# Patient Record
Sex: MALE | Race: ASIAN | NOT HISPANIC OR LATINO | Employment: STUDENT | URBAN - METROPOLITAN AREA
[De-identification: names, ages, dates, MRNs, and addresses within clinical notes are randomized per-mention and may not be internally consistent; named-entity substitution may affect disease eponyms.]

---

## 2017-01-04 ENCOUNTER — ALLSCRIPTS OFFICE VISIT (OUTPATIENT)
Dept: OTHER | Facility: OTHER | Age: 22
End: 2017-01-04

## 2017-05-19 ENCOUNTER — ALLSCRIPTS OFFICE VISIT (OUTPATIENT)
Dept: OTHER | Facility: OTHER | Age: 22
End: 2017-05-19

## 2017-05-21 ENCOUNTER — GENERIC CONVERSION - ENCOUNTER (OUTPATIENT)
Dept: OTHER | Facility: OTHER | Age: 22
End: 2017-05-21

## 2017-05-26 ENCOUNTER — APPOINTMENT (OUTPATIENT)
Dept: PHYSICAL THERAPY | Facility: REHABILITATION | Age: 22
End: 2017-05-26
Payer: COMMERCIAL

## 2017-05-26 PROCEDURE — 97161 PT EVAL LOW COMPLEX 20 MIN: CPT

## 2017-05-26 PROCEDURE — 97140 MANUAL THERAPY 1/> REGIONS: CPT

## 2017-05-26 PROCEDURE — 97110 THERAPEUTIC EXERCISES: CPT

## 2017-05-31 ENCOUNTER — APPOINTMENT (OUTPATIENT)
Dept: PHYSICAL THERAPY | Facility: REHABILITATION | Age: 22
End: 2017-05-31
Payer: COMMERCIAL

## 2017-05-31 PROCEDURE — 97140 MANUAL THERAPY 1/> REGIONS: CPT

## 2017-05-31 PROCEDURE — 97110 THERAPEUTIC EXERCISES: CPT

## 2017-06-07 ENCOUNTER — APPOINTMENT (OUTPATIENT)
Dept: PHYSICAL THERAPY | Facility: REHABILITATION | Age: 22
End: 2017-06-07
Payer: COMMERCIAL

## 2017-06-07 PROCEDURE — 97140 MANUAL THERAPY 1/> REGIONS: CPT

## 2017-06-07 PROCEDURE — 97110 THERAPEUTIC EXERCISES: CPT

## 2017-09-15 ENCOUNTER — ALLSCRIPTS OFFICE VISIT (OUTPATIENT)
Dept: OTHER | Facility: OTHER | Age: 22
End: 2017-09-15

## 2018-01-11 NOTE — PROGRESS NOTES
Chief Complaint  Patient is here for his 3rd HPV vaccination  Active Problems    1  Gastroenteritis (558 9) (K52 9)   2  History of allergy (V15 09) (Z88 9)   3  Need for Menactra vaccination (V03 89) (Z23)   4  Reactive airway disease (493 90) (J45 909)   5  Right foot pain (729 5) (M79 671)   6  Tic disorder (307 20) (F95 9)   7  Upper respiratory infection (465 9) (J06 9)    Current Meds   1  ProAir  (90 Base) MCG/ACT Inhalation Aerosol Solution; 2 PUFFS EVERY 4 TO   6 HOURS AS NEEDED FOR COUGH/WHEEZE;   Therapy: 94YEP3767 to (Last IB:07QFX8336)  Requested for: 69KSR9144 Ordered    Allergies    1  No Known Drug Allergies    Plan  Need for HPV vaccination    · Gardasil 9 Intramuscular Suspension Prefilled Syringe    Future Appointments    Date/Time Provider Specialty Site   08/15/2016 09:00 AM WILLA Murguia  Family Medicine [address] [place] [address]     Signatures   Electronically signed by :  WILLA Shepherd ; [place]  3 2016  5:14PM EST                       (Author)

## 2018-01-11 NOTE — PROGRESS NOTES
Chief Complaint  Patient is here to receive the influenza vaccination  Active Problems    1  Eczema (692 9) (L30 9)   2  Gastroenteritis (558 9) (K52 9)   3  History of allergy (V15 09) (Z88 9)   4  Need for HPV vaccination (V04 89) (Z23)   5  Need for Menactra vaccination (V03 89) (Z23)   6  Need for meningococcal vaccination (V03 89) (Z23)   7  Reactive airway disease (493 90) (J45 909)   8  Right foot pain (729 5) (M79 671)   9  Tic disorder (307 20) (F95 9)   10  Upper respiratory infection (465 9) (J06 9)    Current Meds   1  ProAir  (90 Base) MCG/ACT Inhalation Aerosol Solution; 2 PUFFS EVERY 4 TO   6 HOURS AS NEEDED FOR COUGH/WHEEZE;   Therapy: 28MCG8663 to (Last WB:[id number])  Requested for: 77UPB8456 Ordered   2  Triamcinolone Acetonide 0 5 % External Cream; APPLY 2-3 TIMES DAILY TO   AFFECTED AREA(S); Therapy: 53SRP2849 to (Last Rx:54Drm8237)  Requested for: 05Kyl4300 Ordered    Allergies    1  No Known Drug Allergies    Vitals  Signs    Temperature: 96 3 F    Plan  Need for prophylactic vaccination and inoculation against influenza    · Fluzone Quadrivalent 0 5 ML Intramuscular Suspension    Signatures   Electronically signed by :  WILLA Vasquez ; Oct  8 2016  8:05PM EST                       (Author)

## 2018-01-12 VITALS
HEIGHT: 71 IN | WEIGHT: 141.38 LBS | TEMPERATURE: 101.2 F | HEART RATE: 86 BPM | BODY MASS INDEX: 19.79 KG/M2 | SYSTOLIC BLOOD PRESSURE: 120 MMHG | DIASTOLIC BLOOD PRESSURE: 76 MMHG

## 2018-01-12 NOTE — PROGRESS NOTES
Assessment    1  Encounter for preventive health examination (V70 0) (Z00 00)   2  Eczema (692 9) (L30 9)    Plan  Eczema    · Triamcinolone Acetonide 0 5 % External Cream; APPLY 2-3 TIMES DAILY TO  AFFECTED AREA(S)  Need for meningococcal vaccination    · Trumenba Intramuscular Suspension Prefilled Syringe; INJECT 0 5  ML  Intramuscular; To Be Done: [id number]  Tic disorder    · (1) SICKLE CELL TEST; Status:Active; Requested for:10Hxq6793;     Discussion/Summary    Annual well exam   Routine immunizations are up-to-date  We will administer meningitis be vaccine #3 in fall  Patient will try topical steroid cream for eczema rash  He is cleared for athletic participation  Follow up annually and as needed  Possible side effects of new medications were reviewed with the patient/guardian today  The patient, patient's family was counseled regarding instructions for management, risk factor reductions, impressions  Chief Complaint  well exam      History of Present Illness  HM, 12-18 years Male (Brief):   General Health: The child's health since the last visit is described as good   no illness since last visit  Immunization status: Up to date   the patient has not had any significant adverse reactions to immunizations  Caregiver concerns:   Caregivers deny concerns regarding nutrition, sleep, behavior, school and development  Nutrition/Elimination:   Diet:  his current diet is diverse and healthy  The patient does not use dietary supplements  No elimination issues are expressed  Sleep:  No sleep issues are reported  Behavior: The child's temperament is described as happy, independent and energetic  No behavior issues identified  Health Risks:  No significant risk factors are identified     Childcare/School:   Sports Participation Questions:   History Questions: Cardiac History: no chest pain during exercise, no chest pressure during exercise, no chest discomfort during exercise, no passing out or nearly passing out during exercise and has not passed out or nearly passed out after exercise  Musculoskeletal: no history of a bone or joint injury that required either imaging, surgery, injections, rehabilitation, PT, bracing, casting, or crutches, has not had a bone fracture or dislocation, no history of atlantoaxial neck instability, no history of stress fracture, has not had severe muscle cramps or illness after exercising in the heat, no missed participation due to a sprain, ligament tear or tendonitis, no use of a brace or assistive device and has not had an xray in the past for atlantoaxial neck instability  Neurologic History: no memory loss or confusion after being hit in the head, has not had a concussion or head injury and no seizures  HPI: freshman at [employer]   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   no exertional s/o   no daily Rx  Patient denies chest pain, palpitations, shortness of breath or dizziness  Review of Systems    Constitutional: No complaints of tiredness, feels well, no fever, no chills, no recent weight gain or loss  Eyes: No complaints of eye pain, no discharge from eyes, no eyesight problems, eyes do not itch, no red or dry eyes  ENT: no complaints of nasal discharge, no earache, no loss of hearing, no hoarseness or sore throat, no nosebleeds  Cardiovascular: No complaints of chest pain, no palpitations, normal heart rate, no leg claudication or lower leg edema  Respiratory: No complaints of shortness of breath, no wheezing or cough, no dyspnea on exertion  Gastrointestinal: No complaints of abdominal pain, no nausea or vomiting, no constipation, no diarrhea or bloody stools  Genitourinary: No complaints of testicular pain, no dysuria or nocturia, no incontinence, no hesitancy, no gential lesion  Musculoskeletal: No complaints of joint stiffness or swelling, no myalgias, no limb pain or swelling     Integumentary: No complaints of skin rash, no skin lesions or wounds, no itching, no dry skin  Neurological: No complaints of headache, no numbness or tingling, no dizziness or fainting, no confusion, no convulsions, no limb weakness or difficulty walking  Psychiatric: No complaints of feeling depressed, no suicidal thoughts, no emotional problems, no anxiety, no sleep disturbances or changes in personality  Endocrine: No complaints of muscle weakness, no feelings of weakness, no erectile dysfunction, no deepening of voice, no hot flashes or proptosis  Hematologic/Lymphatic: No complaints of swollen glands, no neck swollen glands, does not bleed or bruise easily  ROS reported by the patient  Active Problems    1  Gastroenteritis (558 9) (K52 9)   2  History of allergy (V15 09) (Z88 9)   3  Need for HPV vaccination (V04 89) (Z23)   4  Need for Menactra vaccination (V03 89) (Z23)   5  Need for meningococcal vaccination (V03 89) (Z23)   6  Reactive airway disease (493 90) (J45 909)   7  Right foot pain (729 5) (M79 671)   8  Tic disorder (307 20) (F95 9)   9  Upper respiratory infection (465 9) (J06 9)    Family History  Mother    · Family history of Esophageal Reflux  Father    · Family history of Nephrolithiasis  Family History    · Family history of Generalized Anxiety Disorder    Social History    · Never A Smoker   · Never Drank Alcohol    Current Meds   1  ProAir  (90 Base) MCG/ACT Inhalation Aerosol Solution; 2 PUFFS EVERY 4 TO   6 HOURS AS NEEDED FOR COUGH/WHEEZE;   Therapy: 51YTH4959 to (Last EW:75DWA2050)  Requested for: 75UTO5161 Ordered    Allergies    1   No Known Drug Allergies    Vitals   Recorded: 24Shv9794 09:10AM Recorded: 04TAW6924 17:15WI   Systolic 835    Diastolic 64    Heart Rate 66    Respiration 12    Temperature 96 4 F    Height  5 ft 10 5 in   Weight 142 lb 2 08 oz 142 lb 2 08 oz   BMI Calculated  20 11   BSA Calculated  1 82     Physical Exam    Constitutional - General appearance: No acute distress, well appearing and well nourished  Head and Face - Head and face: Normocephalic, atraumatic  Eyes - Conjunctiva and lids: No injection, edema or discharge  Pupils and irises: Equal, round, reactive to light bilaterally  Ears, Nose, Mouth, and Throat - Oropharynx: Moist mucosa, normal tongue and tonsils without lesions  Neck - Neck: Supple, symmetric, no masses  Thyroid: No thyromegaly  Pulmonary - Respiratory effort: Normal respiratory rate and rhythm, no increased work of breathing  Auscultation of lungs: Clear bilaterally  Cardiovascular - Auscultation of heart: Regular rate and rhythm, normal S1 and S2, no murmur  Carotid pulses: Normal, 2+ bilaterally  Abdominal aorta: Normal  Examination of extremities for edema and/or varicosities: Normal    Musculoskeletal - Gait and station: Normal gait  Skin - Skin and subcutaneous tissue: Abnormal  Eczema rash bilateral forearms, mildly pruritic  Neurologic - Cranial nerves: Normal    Psychiatric - judgment and insight: Normal  Orientation to person, place, and time: Normal  Recent and remote memory: Normal  Mood and affect: Normal       Procedure    Procedure:   Results: 20/10 in both eyes without corrective device, 20/10 in the right eye without corrective device, 20/10 in the left eye without corrective device      Signatures   Electronically signed by :  WILLA Vasquez ; Aug 18 2016  7:04AM EST                       (Author)

## 2018-01-13 NOTE — MISCELLANEOUS
Message  I spoke with patient's mother today  He is only running a low-grade fever and feels okay daytime  He is bothered with severe cough at night, no relief with Robitussin over-the-counter and Tessalon Perles  He expectorated little bit of blood after long coughing spells at night  Patient is sleeping and resting now  Reportedly MAXIMUM TEMPERATURE yesterday's 99  Prescription for Robitussin with codeine was called into the [facility]  I advise mother to contact me tomorrow if he still running fever or expectorating blood, we will proceed with chest x-ray then  Signatures   Electronically signed by :  WILLA Miller ; May 21 2017 12:42PM EST                       (Author)

## 2018-01-15 NOTE — PROGRESS NOTES
Chief Complaint  pt here for trumemba no 3 today , temp was 98 1      Active Problems    1  Eczema (692 9) (L30 9)   2  Gastroenteritis (558 9) (K52 9)   3  History of allergy (V15 09) (Z88 9)   4  Need for HPV vaccination (V04 89) (Z23)   5  Need for Menactra vaccination (V03 89) (Z23)   6  Need for meningococcal vaccination (V03 89) (Z23)   7  Need for prophylactic vaccination and inoculation against influenza (V04 81) (Z23)   8  Reactive airway disease (493 90) (J45 909)   9  Right foot pain (729 5) (M79 671)   10  Tic disorder (307 20) (F95 9)   11  Upper respiratory infection (465 9) (J06 9)    Current Meds   1  ProAir  (90 Base) MCG/ACT Inhalation Aerosol Solution; 2 PUFFS EVERY 4 TO   6 HOURS AS NEEDED FOR COUGH/WHEEZE;   Therapy: 56BUX9826 to (Last EZ:26VSE9878)  Requested for: 85VER4586 Ordered   2  Triamcinolone Acetonide 0 5 % External Cream; APPLY 2-3 TIMES DAILY TO   AFFECTED AREA(S); Therapy: 64BNP0363 to (Last Rx:51Iyx8140)  Requested for: 24Wnm6942 Ordered    Allergies    1  No Known Drug Allergies    Plan  Health Maintenance    · Trumenba Intramuscular Suspension Prefilled Syringe    Signatures   Electronically signed by :  WILLA Stevens ; Jan 4 2017  4:37PM EST                       (Author)

## 2018-01-17 NOTE — PROGRESS NOTES
Chief Complaint  Pt is here for nurse visit for influenza vaccine FRF      Active Problems    1  Acute sinusitis (461 9) (J01 90)   2  Eczema (692 9) (L30 9)   3  Gastroenteritis (558 9) (K52 9)   4  History of allergy (V15 09) (Z88 9)   5  Infection, upper respiratory (465 9) (J06 9)   6  Need for HPV vaccination (V04 89) (Z23)   7  Need for Menactra vaccination (V03 89) (Z23)   8  Need for meningococcal vaccination (V03 89) (Z23)   9  Need for prophylactic vaccination and inoculation against influenza (V04 81) (Z23)   10  Reactive airway disease (493 90) (J45 909)   11  Right foot pain (729 5) (M79 671)   12  Tic disorder (307 20) (F95 9)    Current Meds   1  Benzonatate 100 MG Oral Capsule; TAKE ONE CAPSULE BY MOUTH 3 TIMES A DAY   AS NEEDED FOR COUGH; Therapy: 97UPY4745 to (Last Rx:26Jqj4370)  Requested for: 34BKV9140 Ordered    Allergies    1  No Known Drug Allergies    Vitals  Signs    Temperature: 96 5 F    Plan  Need for prophylactic vaccination and inoculation against influenza    · Fluzone Quadrivalent 0 5 ML Intramuscular Suspension Prefilled Syringe    Signatures   Electronically signed by :  WILLA Ding ; Sep 16 2017 10:29AM EST                       (Author)

## 2018-01-22 VITALS — TEMPERATURE: 96.5 F

## 2018-10-12 ENCOUNTER — IMMUNIZATION (OUTPATIENT)
Dept: FAMILY MEDICINE CLINIC | Facility: CLINIC | Age: 23
End: 2018-10-12
Payer: COMMERCIAL

## 2018-10-12 DIAGNOSIS — Z23 ENCOUNTER FOR IMMUNIZATION: ICD-10-CM

## 2018-10-12 PROCEDURE — 90686 IIV4 VACC NO PRSV 0.5 ML IM: CPT

## 2018-10-12 PROCEDURE — 90471 IMMUNIZATION ADMIN: CPT

## 2019-10-15 ENCOUNTER — OFFICE VISIT (OUTPATIENT)
Dept: FAMILY MEDICINE CLINIC | Facility: CLINIC | Age: 24
End: 2019-10-15
Payer: COMMERCIAL

## 2019-10-15 VITALS
RESPIRATION RATE: 16 BRPM | BODY MASS INDEX: 22.54 KG/M2 | OXYGEN SATURATION: 97 % | TEMPERATURE: 96.2 F | HEIGHT: 70 IN | HEART RATE: 49 BPM | DIASTOLIC BLOOD PRESSURE: 60 MMHG | SYSTOLIC BLOOD PRESSURE: 100 MMHG | WEIGHT: 157.4 LBS

## 2019-10-15 DIAGNOSIS — Z23 INFLUENZA VACCINE NEEDED: ICD-10-CM

## 2019-10-15 DIAGNOSIS — Z00.00 PHYSICAL EXAM: Primary | ICD-10-CM

## 2019-10-15 DIAGNOSIS — R82.90 ABNORMAL FINDING IN URINE: ICD-10-CM

## 2019-10-15 DIAGNOSIS — Z13.220 LIPID SCREENING: ICD-10-CM

## 2019-10-15 DIAGNOSIS — Z23 ENCOUNTER FOR IMMUNIZATION: ICD-10-CM

## 2019-10-15 LAB
BACTERIA UR QL AUTO: NORMAL /HPF
BILIRUB UR QL STRIP: NEGATIVE
CLARITY UR: CLEAR
COLOR UR: YELLOW
GLUCOSE UR STRIP-MCNC: NEGATIVE MG/DL
HGB UR QL STRIP.AUTO: NEGATIVE
HYALINE CASTS #/AREA URNS LPF: NORMAL /LPF
KETONES UR STRIP-MCNC: NEGATIVE MG/DL
LEUKOCYTE ESTERASE UR QL STRIP: NEGATIVE
NITRITE UR QL STRIP: NEGATIVE
NON-SQ EPI CELLS URNS QL MICRO: NORMAL /HPF
PH UR STRIP.AUTO: 7.5 [PH]
PROT UR STRIP-MCNC: NEGATIVE MG/DL
RBC #/AREA URNS AUTO: NORMAL /HPF
SL AMB  POCT GLUCOSE, UA: NEGATIVE
SL AMB LEUKOCYTE ESTERASE,UA: NEGATIVE
SL AMB POCT BILIRUBIN,UA: NEGATIVE
SL AMB POCT BLOOD,UA: NEGATIVE
SL AMB POCT CLARITY,UA: CLEAR
SL AMB POCT COLOR,UA: ABNORMAL
SL AMB POCT KETONES,UA: NEGATIVE
SL AMB POCT NITRITE,UA: NEGATIVE
SL AMB POCT PH,UA: 6
SL AMB POCT SPECIFIC GRAVITY,UA: 1
SL AMB POCT URINE PROTEIN: ABNORMAL
SL AMB POCT UROBILINOGEN: 0.2
SP GR UR STRIP.AUTO: 1 (ref 1–1.03)
UROBILINOGEN UR QL STRIP.AUTO: 0.2 E.U./DL
WBC #/AREA URNS AUTO: NORMAL /HPF

## 2019-10-15 PROCEDURE — 90686 IIV4 VACC NO PRSV 0.5 ML IM: CPT | Performed by: NURSE PRACTITIONER

## 2019-10-15 PROCEDURE — 99395 PREV VISIT EST AGE 18-39: CPT | Performed by: NURSE PRACTITIONER

## 2019-10-15 PROCEDURE — 81003 URINALYSIS AUTO W/O SCOPE: CPT | Performed by: NURSE PRACTITIONER

## 2019-10-15 PROCEDURE — 90715 TDAP VACCINE 7 YRS/> IM: CPT | Performed by: NURSE PRACTITIONER

## 2019-10-15 PROCEDURE — 90472 IMMUNIZATION ADMIN EACH ADD: CPT | Performed by: NURSE PRACTITIONER

## 2019-10-15 PROCEDURE — 90471 IMMUNIZATION ADMIN: CPT | Performed by: NURSE PRACTITIONER

## 2019-10-15 PROCEDURE — 81001 URINALYSIS AUTO W/SCOPE: CPT | Performed by: NURSE PRACTITIONER

## 2019-10-15 RX ORDER — MELOXICAM 15 MG/1
15 TABLET ORAL DAILY PRN
Refills: 0 | COMMUNITY
Start: 2019-08-13 | End: 2019-10-15 | Stop reason: ALTCHOICE

## 2019-10-15 NOTE — PROGRESS NOTES
FAMILY PRACTICE OFFICE VISIT       NAME: Mary Marcus  AGE: 26 y o  SEX: male       : 1995        MRN: [id number]    DATE: 10/15/2019    Assessment and Plan     Problem List Items Addressed This Visit     None      Visit Diagnoses     Physical exam    -  Primary    Relevant Orders    CBC    Comprehensive metabolic panel    POCT urine dip auto non-scope (Completed)    Lipid screening        Relevant Orders    Lipid panel    Abnormal finding in urine        Relevant Orders    Urinalysis with microscopic (Completed)    Encounter for immunization        Relevant Orders    TDAP VACCINE GREATER THAN OR EQUAL TO 6YO IM (Completed)    Influenza vaccine needed        Relevant Orders    FLUZONE: influenza vaccine, quadrivalent, 0 5 mL (Completed)        1  Physical exam  Healthy-appearing 27-year-old male  Updated Tdap vaccination today  Influenza vaccination administered today  Will check routine blood work, including CBC, CMP, lipid panel  Small amount of protein noted in urine dip  Will send urinalysis for further evaluation  Discussed healthy lifestyle, with regular exercise, eating a healthy well-balanced diet, adequate sleep, healthy alcohol intake, avoiding drugs, driving safely, using condoms with sexual activity  Follow-up in 1 year for annual physical exam, or sooner if needed  CBC    Comprehensive metabolic panel    POCT urine dip auto non-scope    CANCELED: POCT urine dip auto non-scope   2  Lipid screening  Lipid panel   3  Abnormal finding in urine  Urinalysis with microscopic   4  Encounter for immunization  TDAP VACCINE GREATER THAN OR EQUAL TO 6YO IM   5  Influenza vaccine needed  FLUZONE: influenza vaccine, quadrivalent, 0 5 mL           Chief Complaint     Chief Complaint   Patient presents with    Physical Exam       History of Present Illness     Mary Marcus is a 27-year-old male presenting today for physical exam   Accompanied by his brother today  Reports his health is good  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  Exercises through weightlifting, tennis, biking, running, playing football  Eating a fairly healthy diet, with plenty of fruits and vegetables  Has dental exams every 6 months  No problems with vision or hearing  Wears seatbelt with driving  Drinks alcohol occasionally socially  Denies any binge drinking  Denies being sexually active  Review of Systems   Review of Systems   Constitutional: Negative  HENT: Negative  Eyes: Negative  Respiratory: Negative  Cardiovascular: Negative  Gastrointestinal: Negative  Endocrine: Negative  Genitourinary: Negative  Musculoskeletal: Negative  Skin: Negative  Allergic/Immunologic: Negative  Neurological: Negative  Hematological: Negative  Psychiatric/Behavioral: Negative  Active Problem List     Patient Active Problem List   Diagnosis    Reactive airway disease    Tic disorder    Eczema       Past Medical History:  No past medical history on file      Past Surgical History:  Past Surgical History:   Procedure Laterality Date    WISDOM TOOTH EXTRACTION         Family History:  Family History   Problem Relation Age of Onset    MARYAM disease Mother     Nephrolithiasis Father     Anxiety disorder Family        Social History:  Social History     Socioeconomic History    Marital status: Single     Spouse name: Not on file    Number of children: Not on file    Years of education: Not on file    Highest education level: Not on file   Occupational History    Not on file   Social Needs    Financial resource strain: Not on file    Food insecurity:     Worry: Not on file     Inability: Not on file    Transportation needs:     Medical: Not on file     Non-medical: Not on file   Tobacco Use    Smoking status: Never Smoker   Substance and Sexual Activity    Alcohol use: Yes     Comment: occasionally social    Drug use: Never    Sexual activity: Not Currently   Lifestyle    Physical activity:     Days per week: Not on file     Minutes per session: Not on file    Stress: Not on file   Relationships    Social connections:     Talks on phone: Not on file     Gets together: Not on file     Attends Yazidi service: Not on file     Active member of club or organization: Not on file     Attends meetings of clubs or organizations: Not on file     Relationship status: Not on file    Intimate partner violence:     Fear of current or ex partner: Not on file     Emotionally abused: Not on file     Physically abused: Not on file     Forced sexual activity: Not on file   Other Topics Concern    Not on file   Social History Narrative    [facility] [facility] [employer]       I have reviewed the patient's medical history in detail; there are no changes to the history as noted in the electronic medical record  Objective     Vitals:    10/15/19 1107   BP: 100/60   BP Location: Left arm   Patient Position: Sitting   Cuff Size: Standard   Pulse: (!) 49   Resp: 16   Temp: (!) 96 2 °F (35 7 °C)   SpO2: 97%   Weight: 71 4 kg (157 lb 6 4 oz)   Height: 5' 10" (1 778 m)     Wt Readings from Last 3 Encounters:   10/15/19 71 4 kg (157 lb 6 4 oz)   05/19/17 64 1 kg (141 lb 6 1 oz) (29 %, Z= -0 55)*   08/15/16 64 5 kg (142 lb 2 1 oz) (35 %, Z= -0 39)*     * Growth percentiles are based on CDC (Boys, 2-20 Years) data  Body mass index is 22 58 kg/m²  PHQ-9 Depression Screening    PHQ-9:    Frequency of the following problems over the past two weeks:       Little interest or pleasure in doing things:  0 - not at all  Feeling down, depressed, or hopeless:  0 - not at all  PHQ-2 Score:  0       Physical Exam   Constitutional: He is oriented to person, place, and time  He appears well-developed and well-nourished  HENT:   Head: Normocephalic and atraumatic     Right Ear: Tympanic membrane, external ear and ear canal normal    Left Ear: Tympanic membrane, external ear and ear canal normal    Mouth/Throat: Uvula is midline and oropharynx is clear and moist    Eyes: Pupils are equal, round, and reactive to light  Conjunctivae are normal    Neck: Normal range of motion  Neck supple  No thyromegaly present  Cardiovascular: Normal rate and regular rhythm  No murmur heard  HR 60   Pulmonary/Chest: Effort normal and breath sounds normal    Abdominal: Soft  Bowel sounds are normal  There is no tenderness  Musculoskeletal: Normal range of motion  He exhibits no edema  Lymphadenopathy:     He has no cervical adenopathy  Neurological: He is alert and oriented to person, place, and time  Skin: No rash noted  Psychiatric: He has a normal mood and affect  Nursing note and vitals reviewed  ALLERGIES:  Allergies no known allergies    Current Medications     No current outpatient medications on file  No current facility-administered medications for this visit            Health Maintenance     Health Maintenance   Topic Date Due    Depression Screening PHQ  10/15/2020    BMI: Adult  10/15/2020    DTaP,Tdap,and Td Vaccines (8 - Td) 10/15/2029    Pneumococcal Vaccine: 65+ Years (1 of 2 - PCV13) 12/17/2062    INFLUENZA VACCINE  Completed    HEPATITIS B VACCINES  Completed    Pneumococcal Vaccine: Pediatrics (0 to 5 Years) and At-Risk Patients (6 to 59 Years)  Aged [employer] History   Administered Date(s) Administered    DTaP 5 02/18/1998, 04/20/1998, 06/18/1998, 06/21/1999, 01/21/2002    HPV Quadrivalent 05/15/2015, 08/25/2015    HPV9 06/03/2016    Hep A, ped/adol, 2 dose 09/25/2012    Hep B, adult 02/18/1998, 04/20/1998, 12/23/1998    Hib (PRP-OMP) 02/18/1998, 04/20/1998, 06/18/1998, 01/18/2001    INFLUENZA 09/25/2012, 10/12/2015, 10/07/2016, 09/15/2017    IPV 02/18/1998, 04/20/1998, 06/21/1999, 01/21/2002    Influenza Quadrivalent Preservative Free 3 years and older IM 10/07/2016, 09/15/2017    Influenza TIV (IM) 09/02/2011, 10/14/2013, 10/03/2014, 10/12/2015    Influenza, injectable, quadrivalent, preservative free 0 5 mL 10/12/2018, 10/15/2019    MMR 08/18/1999, 01/17/2003    Meningococcal B, Recombinant 10/19/2015, 06/30/2016, 01/04/2017    Meningococcal, Unknown Serogroups 07/31/2009, 05/15/2015    Tdap 07/31/2009, 10/15/2019    Typhoid, ViCPs 09/26/2012    Varicella 01/18/2001, 07/31/2009       AKILAH House

## 2019-10-16 NOTE — RESULT ENCOUNTER NOTE
Please let patient know urinalysis is normal  There is no protein in his urine that was sent to the lab

## 2019-12-30 ENCOUNTER — OFFICE VISIT (OUTPATIENT)
Dept: FAMILY MEDICINE CLINIC | Facility: CLINIC | Age: 24
End: 2019-12-30
Payer: COMMERCIAL

## 2019-12-30 VITALS
HEIGHT: 70 IN | SYSTOLIC BLOOD PRESSURE: 122 MMHG | HEART RATE: 56 BPM | RESPIRATION RATE: 18 BRPM | BODY MASS INDEX: 22.22 KG/M2 | DIASTOLIC BLOOD PRESSURE: 80 MMHG | TEMPERATURE: 98.8 F | WEIGHT: 155.2 LBS | OXYGEN SATURATION: 98 %

## 2019-12-30 DIAGNOSIS — J06.9 UPPER RESPIRATORY TRACT INFECTION, UNSPECIFIED TYPE: ICD-10-CM

## 2019-12-30 DIAGNOSIS — J32.9 SINUSITIS, UNSPECIFIED CHRONICITY, UNSPECIFIED LOCATION: Primary | ICD-10-CM

## 2019-12-30 PROCEDURE — 1036F TOBACCO NON-USER: CPT | Performed by: FAMILY MEDICINE

## 2019-12-30 PROCEDURE — 3008F BODY MASS INDEX DOCD: CPT | Performed by: FAMILY MEDICINE

## 2019-12-30 PROCEDURE — 99213 OFFICE O/P EST LOW 20 MIN: CPT | Performed by: FAMILY MEDICINE

## 2019-12-30 RX ORDER — AMOXICILLIN AND CLAVULANATE POTASSIUM 875; 125 MG/1; MG/1
1 TABLET, FILM COATED ORAL EVERY 12 HOURS SCHEDULED
Qty: 14 TABLET | Refills: 0 | Status: SHIPPED | OUTPATIENT
Start: 2019-12-30 | End: 2020-01-06

## 2019-12-30 NOTE — PROGRESS NOTES
FAMILY PRACTICE OFFICE VISIT       NAME: Kirsten Zeng  AGE: 27 y o  SEX: male       : 1995        MRN: [id number]    DATE: 2019  TIME: 9:10 AM    Assessment and Plan     Problem List Items Addressed This Visit     None      Visit Diagnoses     Sinusitis, unspecified chronicity, unspecified location    -  Primary    Relevant Medications    amoxicillin-clavulanate (AUGMENTIN) 875-125 mg per tablet    Upper respiratory tract infection, unspecified type        Relevant Medications    amoxicillin-clavulanate (AUGMENTIN) 875-125 mg per tablet        Sinusitis, upper respiratory infection:  72-year-old male presents today with symptoms that are consistent with sinusitis, upper respiratory infection  Patient states he is feeling better today  I will call in Rx for Augmentin and have him decide if he continues to feel better than will hold off on him taking Augmentin  If symptoms do persist however then he may take this with food for 7 days  Continue with symptomatic treatment and supportive measures including adequate hydration  Recommend nasal saline rinses, hot steam humidification in the shower, cool mist humidifier in the bedroom  May also benefit from honey as a cough suppressant as well  Return parameters discussed  There are no Patient Instructions on file for this visit  Chief Complaint     Chief Complaint   Patient presents with    Sinus Problem       History of Present Illness     HPI  72-year-old male presents today with 1-2 we h/o nasal congestion, low grade fever sinus pressure, pnd, cough that is occasionally productive, nasal drainage that is green  Has been taking dayquil, nyquil, vit c     Has been exposed to sick conatcts   Review of Systems   Review of Systems   Constitutional: Negative for chills and fever  HENT: Positive for congestion, postnasal drip, rhinorrhea and sinus pressure  Negative for ear pain and sore throat  Respiratory: Positive for cough  Negative for chest tightness, shortness of breath and wheezing  Active Problem List     Patient Active Problem List   Diagnosis    Reactive airway disease    Tic disorder    Eczema       Past Medical History:  History reviewed  No pertinent past medical history      Past Surgical History:  Past Surgical History:   Procedure Laterality Date    WISDOM TOOTH EXTRACTION         Family History:  Family History   Problem Relation Age of Onset    MARYAM disease Mother     Nephrolithiasis Father     Anxiety disorder Family        Social History:  Social History     Socioeconomic History    Marital status: Single     Spouse name: Not on file    Number of children: Not on file    Years of education: Not on file    Highest education level: Not on file   Occupational History    Not on file   Social Needs    Financial resource strain: Not on file    Food insecurity:     Worry: Not on file     Inability: Not on file    Transportation needs:     Medical: Not on file     Non-medical: Not on file   Tobacco Use    Smoking status: Never Smoker    Smokeless tobacco: Never Used   Substance and Sexual Activity    Alcohol use: Yes     Comment: occasionally social    Drug use: Never    Sexual activity: Not Currently   Lifestyle    Physical activity:     Days per week: Not on file     Minutes per session: Not on file    Stress: Not on file   Relationships    Social connections:     Talks on phone: Not on file     Gets together: Not on file     Attends Protestant service: Not on file     Active member of club or organization: Not on file     Attends meetings of clubs or organizations: Not on file     Relationship status: Not on file    Intimate partner violence:     Fear of current or ex partner: Not on file     Emotionally abused: Not on file     Physically abused: Not on file     Forced sexual activity: Not on file   Other Topics Concern    Not on file   Social History Narrative    Senior year [employer] I have reviewed the patient's medical history in detail; there are no changes to the history as noted in the electronic medical record  Objective     Vitals:    12/30/19 0806   BP: 122/80   Pulse: 56   Resp: 18   Temp: 98 8 °F (37 1 °C)   SpO2: 98%     Wt Readings from Last 3 Encounters:   12/30/19 70 4 kg (155 lb 3 2 oz)   10/15/19 71 4 kg (157 lb 6 4 oz)   05/19/17 64 1 kg (141 lb 6 1 oz) (29 %, Z= -0 55)*     * Growth percentiles are based on CDC (Boys, 2-20 Years) data  Physical Exam   Constitutional: He appears well-developed and well-nourished  HENT:   Head: Normocephalic and atraumatic  Mouth/Throat: Oropharynx is clear and moist  No oropharyngeal exudate  TMs intact and clear  Nares with edema noted  Tender to palpation of maxillary sinuses bilaterally and left ethmoid sinus  Postnasal drainage with mild erythema noted in posterior oropharynx  Neck: Normal range of motion  Neck supple  Cardiovascular: Normal rate, regular rhythm and normal heart sounds  Pulmonary/Chest: Effort normal and breath sounds normal  He has no wheezes  Musculoskeletal: Normal range of motion  Lymphadenopathy:     He has no cervical adenopathy  Neurological: He is alert  Nursing note and vitals reviewed        Pertinent Laboratory/Diagnostic Studies:  No results found for: GLUCOSE, BUN, CREATININE, CALCIUM, NA, K, CO2, CL  No results found for: ALT, AST, GGT, ALKPHOS, BILITOT    No results found for: WBC, HGB, HCT, MCV, PLT    No results found for: TSH    No results found for: CHOL  No results found for: TRIG  No results found for: HDL  No results found for: LDLCALC  No results found for: HGBA1C    Results for orders placed or performed in visit on 10/15/19   Urinalysis with microscopic   Result Value Ref Range    Clarity, UA Clear     Color, UA Yellow     Specific [place], UA 1 003 1 003 - 1 030    pH, UA 7 5 4 5, 5 0, 5 5, 6 0, 6 5, 7 0, 7 5, 8 0    Glucose, UA Negative Negative mg/dl    Ketones, UA Negative Negative mg/dl    Blood, UA Negative Negative    Protein, UA Negative Negative mg/dl    Nitrite, UA Negative Negative    Bilirubin, UA Negative Negative    Urobilinogen, UA 0 2 0 2, 1 0 E U /dl E U /dl    Leukocytes, UA Negative Negative    WBC, UA None Seen None Seen, 0-5, 5-55, 5-65 /hpf    RBC, UA None Seen None Seen, 0-5 /hpf    Hyaline Casts, UA None Seen None Seen /lpf    Bacteria, UA None Seen None Seen, Occasional /hpf    Epithelial Cells None Seen None Seen, Occasional /hpf   POCT urine dip auto non-scope   Result Value Ref Range     COLOR,UA pale yellow     CLARITY,UA clear     SPECIFIC GRAVITY,UA 1 005      PH,UA 6 0     LEUKOCYTE ESTERASE,UA negative     NITRITE,UA negative     GLUCOSE, UA negative     KETONES,UA negative     BILIRUBIN,UA negative     BLOOD,UA negative     POCT URINE PROTEIN 30+     SL AMB POCT UROBILINOGEN 0 2        No orders of the defined types were placed in this encounter  ALLERGIES:  No Known Allergies    Current Medications     Current Outpatient Medications   Medication Sig Dispense Refill    amoxicillin-clavulanate (AUGMENTIN) 875-125 mg per tablet Take 1 tablet by mouth every 12 (twelve) hours for 7 days 14 tablet 0     No current facility-administered medications for this visit            Health Maintenance     Health Maintenance   Topic Date Due    HIV Screening  12/17/2012    Hepatitis A Vaccine (2 of 2 - 2-dose series) 03/25/2013    Depression Screening PHQ  10/15/2020    BMI: Adult  10/15/2020    DTaP,Tdap,and Td Vaccines (8 - Td) 10/15/2029    Pneumococcal Vaccine: 65+ Years (1 of 2 - PCV13) 12/17/2062    Influenza Vaccine  Completed    HIB Vaccine  Completed    Hepatitis B Vaccine  Completed    IPV Vaccine  Completed    HPV Vaccine  Completed    Pneumococcal Vaccine: Pediatrics (0 to 5 Years) and At-Risk Patients (6 to 59 Years)  Aged Out    Meningococcal ACWY Vaccine  Aged [employer] History   Administered Date(s) Administered  DTaP 5 02/18/1998, 04/20/1998, 06/18/1998, 06/21/1999, 01/21/2002    HPV Quadrivalent 05/15/2015, 08/25/2015    HPV9 06/03/2016    Hep A, ped/adol, 2 dose 09/25/2012    Hep B, adult 02/18/1998, 04/20/1998, 12/23/1998    Hib (PRP-OMP) 02/18/1998, 04/20/1998, 06/18/1998, 01/18/2001    INFLUENZA 09/25/2012, 10/12/2015, 10/07/2016, 09/15/2017    IPV 02/18/1998, 04/20/1998, 06/21/1999, 01/21/2002    Influenza Quadrivalent Preservative Free 3 years and older IM 10/07/2016, 09/15/2017    Influenza TIV (IM) 09/02/2011, 10/14/2013, 10/03/2014, 10/12/2015    Influenza, injectable, quadrivalent, preservative free 0 5 mL 10/12/2018, 10/15/2019    MMR 08/18/1999, 01/17/2003    Meningococcal B, Recombinant 10/19/2015, 06/30/2016, 01/04/2017    Meningococcal, Unknown Serogroups 07/31/2009, 05/15/2015    Tdap 07/31/2009, 10/15/2019    Typhoid, ViCPs 09/26/2012    Varicella 01/18/2001, 07/31/2009       Page Bain MD

## 2020-12-22 ENCOUNTER — NURSE TRIAGE (OUTPATIENT)
Dept: OTHER | Facility: OTHER | Age: 25
End: 2020-12-22

## 2020-12-22 DIAGNOSIS — Z20.822 EXPOSURE TO COVID-19 VIRUS: Primary | ICD-10-CM

## 2023-03-29 ENCOUNTER — OFFICE VISIT (OUTPATIENT)
Dept: GASTROENTEROLOGY | Facility: CLINIC | Age: 28
End: 2023-03-29

## 2023-03-29 ENCOUNTER — APPOINTMENT (OUTPATIENT)
Dept: LAB | Facility: CLINIC | Age: 28
End: 2023-03-29

## 2023-03-29 VITALS
OXYGEN SATURATION: 99 % | HEIGHT: 71 IN | BODY MASS INDEX: 21.84 KG/M2 | WEIGHT: 156 LBS | DIASTOLIC BLOOD PRESSURE: 80 MMHG | HEART RATE: 78 BPM | SYSTOLIC BLOOD PRESSURE: 118 MMHG

## 2023-03-29 DIAGNOSIS — K64.2 GRADE III HEMORRHOIDS: ICD-10-CM

## 2023-03-29 DIAGNOSIS — R10.30 LOWER ABDOMINAL PAIN: ICD-10-CM

## 2023-03-29 DIAGNOSIS — R10.30 LOWER ABDOMINAL PAIN: Primary | ICD-10-CM

## 2023-03-29 DIAGNOSIS — Z00.00 PREVENTATIVE HEALTH CARE: ICD-10-CM

## 2023-03-29 DIAGNOSIS — E73.9 LACTOSE INTOLERANCE: ICD-10-CM

## 2023-03-29 LAB
ALBUMIN SERPL BCP-MCNC: 4.1 G/DL (ref 3.5–5)
ALP SERPL-CCNC: 69 U/L (ref 46–116)
ALT SERPL W P-5'-P-CCNC: 31 U/L (ref 12–78)
ANION GAP SERPL CALCULATED.3IONS-SCNC: 3 MMOL/L (ref 4–13)
AST SERPL W P-5'-P-CCNC: 19 U/L (ref 5–45)
BILIRUB SERPL-MCNC: 1.04 MG/DL (ref 0.2–1)
BUN SERPL-MCNC: 13 MG/DL (ref 5–25)
CALCIUM SERPL-MCNC: 9.2 MG/DL (ref 8.3–10.1)
CHLORIDE SERPL-SCNC: 106 MMOL/L (ref 96–108)
CHOLEST SERPL-MCNC: 209 MG/DL
CO2 SERPL-SCNC: 29 MMOL/L (ref 21–32)
CREAT SERPL-MCNC: 1.07 MG/DL (ref 0.6–1.3)
ERYTHROCYTE [DISTWIDTH] IN BLOOD BY AUTOMATED COUNT: 12.2 % (ref 11.6–15.1)
GFR SERPL CREATININE-BSD FRML MDRD: 95 ML/MIN/1.73SQ M
GLUCOSE P FAST SERPL-MCNC: 87 MG/DL (ref 65–99)
HCT VFR BLD AUTO: 47.5 % (ref 36.5–49.3)
HDLC SERPL-MCNC: 72 MG/DL
HGB BLD-MCNC: 15.6 G/DL (ref 12–17)
LDLC SERPL CALC-MCNC: 126 MG/DL (ref 0–100)
MCH RBC QN AUTO: 29.5 PG (ref 26.8–34.3)
MCHC RBC AUTO-ENTMCNC: 32.8 G/DL (ref 31.4–37.4)
MCV RBC AUTO: 90 FL (ref 82–98)
NONHDLC SERPL-MCNC: 137 MG/DL
PLATELET # BLD AUTO: 198 THOUSANDS/UL (ref 149–390)
PMV BLD AUTO: 10.2 FL (ref 8.9–12.7)
POTASSIUM SERPL-SCNC: 3.9 MMOL/L (ref 3.5–5.3)
PROT SERPL-MCNC: 7.2 G/DL (ref 6.4–8.4)
RBC # BLD AUTO: 5.28 MILLION/UL (ref 3.88–5.62)
SODIUM SERPL-SCNC: 138 MMOL/L (ref 135–147)
TRIGL SERPL-MCNC: 57 MG/DL
WBC # BLD AUTO: 4.44 THOUSAND/UL (ref 4.31–10.16)

## 2023-03-29 NOTE — PROGRESS NOTES
[facility] - Outpatient Consultation  Camilo Cameron 24 y o  male MRN: [id number]  Encounter: [id number]      PCP: Isabella Graf MD  Referring: Isabella Graf MD  [address] [place] [id number]      ASSESSMENT AND PLAN:      1  Lower abdominal pain  2  Lactose intolerance  symptoms are most consistent with lactose intolerance, and recommend a strict dairy free diet  Use of over-the-counter Lactaid pills and dairy alternatives are recommended- can be used to prevent symptoms  Rule out alternative organic etiologies, including celiac with blood work  - CBC; Future  - Comprehensive metabolic panel; Future  - Celiac Disease Antibody Profile; Future    3  Grade III hemorrhoids  Present on digital rectal exam with both internal and external disease  Recommend psyllium fiber  Suggest sitz bath   2 week course of topical steroid  Discussed consideration of hemorrhoidal banding if symptoms are progressive    4  Preventative health care  - Hemoglobin A1C; Future  - Lipid panel; Future    ______________________________________________________________________    CC:  Chief Complaint   Patient presents with   • Abdominal Pain     After dairy intake  • Hemorrhoids       HPI:      Patient is a 59-year-old male who is seen in office visit consultation for abdominal pain, and concern for hemorrhoids  He has reactive airway disease, eczema  He is present with his mother at today's visit, who contributes to history  Shortly after intake of a milk smoothie, and after other dairy intakes at restaurants and while on vacation, he has experienced intermittent lower abdominal pain  It is associated with foul-smelling gas, flatus  Symptoms have worsened over the last 6 weeks  Symptoms resolved spontaneously  He has symptoms a few times per week  He notes avoidance of dairy products does improve his symptoms  Additionally he has been experiencing a fullness in his rectal area    The symptoms have been ongoing for years, with intermittent painful anal area occasional blood, and pruritus with inability to feel clean after wiping  Denies any nocturnal symptoms, unintentional weight loss  Abdominal Pain  This is a recurrent problem  The current episode started more than 1 month ago  The onset quality is gradual  The problem occurs daily  The problem has been waxing and waning  The pain is located in the suprapubic region  The pain is at a severity of 6/10  The quality of the pain is aching and cramping  The abdominal pain radiates to the suprapubic region  Associated symptoms include belching and flatus  Pertinent negatives include no anorexia, arthralgias, constipation, diarrhea, dysuria, fever, frequency, headaches, hematochezia, hematuria, melena, myalgias, nausea, vomiting or weight loss  The pain is aggravated by eating  The pain is relieved by bowel movements  REVIEW OF SYSTEMS:    CONSTITUTIONAL: Denies any fever, chills, rigors, and weight loss  HEENT: No earache or tinnitus  Denies hearing loss or visual disturbances  CARDIOVASCULAR: No chest pain or palpitations  RESPIRATORY: Denies any cough, hemoptysis, shortness of breath or dyspnea on exertion  GASTROINTESTINAL: As noted in the History of Present Illness  GENITOURINARY: No problems with urination  Denies any hematuria or dysuria  NEUROLOGIC: No dizziness or vertigo, denies headaches  MUSCULOSKELETAL: Denies any muscle or joint pain  SKIN: Denies skin rashes or itching  ENDOCRINE: Denies excessive thirst  Denies intolerance to heat or cold  PSYCHOSOCIAL: Denies depression or anxiety  Denies any recent memory loss  Historical Information   History reviewed  No pertinent past medical history    Past Surgical History:   Procedure Laterality Date   • WISDOM TOOTH EXTRACTION       Social History   Social History     Substance and Sexual Activity   Alcohol Use Yes    Comment: occasionally social     Social "History     Substance and Sexual Activity   Drug Use Never     Social History     Tobacco Use   Smoking Status Never   Smokeless Tobacco Never     Family History   Problem Relation Age of Onset   • MARYAM disease Mother    • Nephrolithiasis Father    • Anxiety disorder Family        Meds/Allergies     No current outpatient medications on file  No Known Allergies        Objective     Blood pressure 118/80, pulse 78, height 5' 11\" (1 803 m), weight 70 8 kg (156 lb), SpO2 99 %  Body mass index is 21 76 kg/m²  PHYSICAL EXAM:      General Appearance:   Alert, cooperative, no distress   HEENT:   Normocephalic, atraumatic, anicteric  Neck:  Supple, symmetrical, trachea midline   Lungs:   Clear to auscultation bilaterally; no rales, rhonchi or wheezing; respirations unlabored    Heart[de-identified]   Regular rate and rhythm; no murmur, rub, or gallop  Abdomen:   Soft, non-tender, non-distended; normal bowel sounds; no masses, no organomegaly    Genitalia:   Deferred    Rectal:    Evidence of external hemorrhoids, and internal hemorrhoids palpated on digital rectal exam   There is no pain to digital insertion, there is normal pelvic floor relaxation  There is no evidence of anal fissure at this time  There is evidence of perirectal skin excoriation, which appears acute  Extremities:  No cyanosis, clubbing or edema    Pulses:  2+ and symmetric    Skin:  No jaundice, rashes, or lesions    Lymph nodes:  No palpable cervical lymphadenopathy        Lab Results:     No results found for: WBC, HGB, HCT, MCV, PLT    No results found for: NA, K, CL, CO2, ANIONGAP, BUN, CREATININE, GLUCOSE, GLUF, CALCIUM, CORRECTEDCA, AST, ALT, ALKPHOS, PROT, BILITOT, EGFR    No results found for: INR, PROTIME      Radiology Results:   No results found  Portions of the record may have been created with voice recognition software   Occasional wrong word or \"sound a like\" substitutions may have occurred due to the inherent limitations of voice " recognition software  Read the chart carefully and recognize, using context, where substitutions have occurred

## 2023-03-29 NOTE — PATIENT INSTRUCTIONS
For your bowel habits, as we discussed during today's visit,  - I would recommend starting psyllium, fiber supplementation  This can be done with use of Konsyl, Citrucil, Metamucil or Benefiber fiber, which can be purchased over-the-counter  I would recommend starting slow with 1 tsp mixed into a large glass of water, once a day, and increasing to goal of 1 tbsp mixed into a large glass of water per day    - this helps with both diarrhea and constipation, helping to bulk the stool, and should not cause constipation or diarrhea, but treat both  - the only side effect of this can be bloating, if this occurs, cut down on the dose, and increase your water intake or alternatively, consider switching to an alternative as listed above no

## 2023-03-30 LAB
ENDOMYSIUM IGA SER QL: NEGATIVE
GLIADIN PEPTIDE IGA SER-ACNC: 5 UNITS (ref 0–19)
GLIADIN PEPTIDE IGG SER-ACNC: 15 UNITS (ref 0–19)
IGA SERPL-MCNC: 179 MG/DL (ref 90–386)
TTG IGA SER-ACNC: <2 U/ML (ref 0–3)
TTG IGG SER-ACNC: <2 U/ML (ref 0–5)

## 2023-03-31 LAB
EST. AVERAGE GLUCOSE BLD GHB EST-MCNC: 105 MG/DL
HBA1C MFR BLD: 5.3 %

## 2023-12-13 ENCOUNTER — TELEPHONE (OUTPATIENT)
Dept: FAMILY MEDICINE CLINIC | Facility: CLINIC | Age: 28
End: 2023-12-13

## 2023-12-22 NOTE — TELEPHONE ENCOUNTER
12/22/23 12:08 PM        The office's request has been received, reviewed, and the patient chart updated. The PCP has successfully been removed with a patient attribution note. This message will now be completed.        Thank you  Alok Aragon

## 2024-08-15 ENCOUNTER — TELEPHONE (OUTPATIENT)
Age: 29
End: 2024-08-15

## 2024-08-15 NOTE — TELEPHONE ENCOUNTER
Pt's mother calling with questions about cost for travel consult. Advised of consultation fee and the prices of vaccines as request by his mother. Did advise that we will not know for sure what vaccines are needed until consultation with the physician.

## 2024-09-03 ENCOUNTER — OFFICE VISIT (OUTPATIENT)
Dept: INFECTIOUS DISEASES | Facility: CLINIC | Age: 29
End: 2024-09-03

## 2024-09-03 VITALS
TEMPERATURE: 97.6 F | WEIGHT: 156 LBS | DIASTOLIC BLOOD PRESSURE: 66 MMHG | HEART RATE: 58 BPM | OXYGEN SATURATION: 97 % | HEIGHT: 71 IN | SYSTOLIC BLOOD PRESSURE: 102 MMHG | RESPIRATION RATE: 16 BRPM | BODY MASS INDEX: 21.84 KG/M2

## 2024-09-03 DIAGNOSIS — Z71.84 TRAVEL ADVICE ENCOUNTER: Primary | ICD-10-CM

## 2024-09-03 DIAGNOSIS — Z29.89 NEED FOR MALARIA PROPHYLAXIS: ICD-10-CM

## 2024-09-03 PROCEDURE — 99401 PREV MED CNSL INDIV APPRX 15: CPT | Performed by: INTERNAL MEDICINE

## 2024-09-03 PROCEDURE — 90690 TYPHOID VACCINE ORAL: CPT

## 2024-09-03 PROCEDURE — 90473 IMMUNE ADMIN ORAL/NASAL: CPT

## 2024-09-03 RX ORDER — ATOVAQUONE AND PROGUANIL HYDROCHLORIDE 250; 100 MG/1; MG/1
1 TABLET, FILM COATED ORAL
Qty: 18 TABLET | Refills: 1 | Status: SHIPPED | OUTPATIENT
Start: 2025-01-01 | End: 2025-01-19

## 2024-09-03 NOTE — PROGRESS NOTES
Travel Clinic    Patient is traveling to countries or areas within countries where immunizations are required or recommended:   Arriving in [facility], then [facility], then [facility] ([facility]), then [facility], then [facility].  Total trip 2 weeks.    UTD with Tdap, Hep A, Hep B, Meningitis.      Patient states they will visit underdeveloped areas with poor sanitation Yes/No: Yes   Patient requires malaria prophylaxis Yes/No: Yes    No orders of the defined types were placed in this encounter.      Patient instructed how to take medications Yes/No: Yes  Patient warned about side effects Yes/No: Yes  Patient declined Yes/No: No

## 2024-10-21 DIAGNOSIS — Z71.84 TRAVEL ADVICE ENCOUNTER: Primary | ICD-10-CM

## 2024-10-21 RX ORDER — ACETAZOLAMIDE 125 MG/1
TABLET ORAL
Qty: 6 TABLET | Refills: 0 | Status: SHIPPED | OUTPATIENT
Start: 2024-10-21

## 2024-10-21 NOTE — TELEPHONE ENCOUNTER
Pt's mother called back I relayed message in the chart. Please send script to [id number] - [place]K[facility] - [address] [place] [address] . Please call the mother back with any questions or concerns

## 2024-10-21 NOTE — TELEPHONE ENCOUNTER
Patient called the RX Refill Line. Message is being forwarded to the office.     Patient's mother is requesting a medication be sent to the pharmacy for high altitude sickness.    Please contact patient's mother at [phone] with any questions

## 2024-10-21 NOTE — TELEPHONE ENCOUNTER
Contacted patient's mother.  Per Dr. Cullen, this was discussed in the appointment in great detail. The side effects of this medication are that it makes carbonated drinks taste awful and you can get the feeling of pins and needles in the face and hands. The locals there have a tea that they can give which works great. He wants to make sure that he wants the medication. IF SO, which pharmacy? The script will be 1 tab twice daily, starting 24 hours before arrival at the high [facility] and continuing for the first 48 hours while there. He will not provide any more than 72 hours worth and will not be adding refills if patient would like to move forward with taking this.

## 2024-10-22 ENCOUNTER — TELEPHONE (OUTPATIENT)
Dept: INFECTIOUS DISEASES | Facility: CLINIC | Age: 29
End: 2024-10-22

## 2024-10-22 NOTE — TELEPHONE ENCOUNTER
Attempt to contact patient.    LM for pt to CB to let them know that this has been corrected, and patient may pick this up at any time.

## 2024-10-22 NOTE — TELEPHONE ENCOUNTER
Patient called the RX Refill Line. Message is being forwarded to the office.     Patient is requesting a change in the start date for   atovaquone-proguanil (MALARONE) 250-100 mg . The start date is 01/01/2025 so the pharm will not release it until that date.  He will be leaving the state but not entering malaria areas until the new year but he needs to pick it up sooner as he will be leaving the state.  They are asking if it can be dated for the week of thanksgiving as he will be in the area and near the pharm.  He will not take the medication until he is entering malaria areas as stated in directions.    Please contact patient at [phone]   With any questions